# Patient Record
Sex: FEMALE | Race: AMERICAN INDIAN OR ALASKA NATIVE
[De-identification: names, ages, dates, MRNs, and addresses within clinical notes are randomized per-mention and may not be internally consistent; named-entity substitution may affect disease eponyms.]

---

## 2020-03-23 ENCOUNTER — HOSPITAL ENCOUNTER (EMERGENCY)
Dept: HOSPITAL 5 - ED | Age: 55
Discharge: HOME | End: 2020-03-23
Payer: COMMERCIAL

## 2020-03-23 VITALS — DIASTOLIC BLOOD PRESSURE: 109 MMHG | SYSTOLIC BLOOD PRESSURE: 158 MMHG

## 2020-03-23 DIAGNOSIS — G51.0: Primary | ICD-10-CM

## 2020-03-23 DIAGNOSIS — I10: ICD-10-CM

## 2020-03-23 DIAGNOSIS — F17.200: ICD-10-CM

## 2020-03-23 LAB
APTT BLD: 27.4 SEC. (ref 24.2–36.6)
BASOPHILS # (AUTO): 0 K/MM3 (ref 0–0.1)
BASOPHILS NFR BLD AUTO: 0.7 % (ref 0–1.8)
BUN SERPL-MCNC: 19 MG/DL (ref 7–17)
BUN/CREAT SERPL: 21 %
CALCIUM SERPL-MCNC: 9.5 MG/DL (ref 8.4–10.2)
EOSINOPHIL # BLD AUTO: 0.1 K/MM3 (ref 0–0.4)
EOSINOPHIL NFR BLD AUTO: 1.9 % (ref 0–4.3)
HCT VFR BLD CALC: 39.2 % (ref 30.3–42.9)
HEMOLYSIS INDEX: 7
HGB BLD-MCNC: 12.7 GM/DL (ref 10.1–14.3)
INR PPP: 0.95 (ref 0.87–1.13)
LYMPHOCYTES # BLD AUTO: 2.5 K/MM3 (ref 1.2–5.4)
LYMPHOCYTES NFR BLD AUTO: 46.7 % (ref 13.4–35)
MCHC RBC AUTO-ENTMCNC: 32 % (ref 30–34)
MCV RBC AUTO: 89 FL (ref 79–97)
MONOCYTES # (AUTO): 0.5 K/MM3 (ref 0–0.8)
MONOCYTES % (AUTO): 9.3 % (ref 0–7.3)
PLATELET # BLD: 282 K/MM3 (ref 140–440)
RBC # BLD AUTO: 4.42 M/MM3 (ref 3.65–5.03)

## 2020-03-23 PROCEDURE — 82962 GLUCOSE BLOOD TEST: CPT

## 2020-03-23 PROCEDURE — 36415 COLL VENOUS BLD VENIPUNCTURE: CPT

## 2020-03-23 PROCEDURE — 93010 ELECTROCARDIOGRAM REPORT: CPT

## 2020-03-23 PROCEDURE — 80048 BASIC METABOLIC PNL TOTAL CA: CPT

## 2020-03-23 PROCEDURE — 85610 PROTHROMBIN TIME: CPT

## 2020-03-23 PROCEDURE — 85025 COMPLETE CBC W/AUTO DIFF WBC: CPT

## 2020-03-23 PROCEDURE — 70450 CT HEAD/BRAIN W/O DYE: CPT

## 2020-03-23 PROCEDURE — 84484 ASSAY OF TROPONIN QUANT: CPT

## 2020-03-23 PROCEDURE — 85730 THROMBOPLASTIN TIME PARTIAL: CPT

## 2020-03-23 PROCEDURE — 85670 THROMBIN TIME PLASMA: CPT

## 2020-03-23 PROCEDURE — 93005 ELECTROCARDIOGRAM TRACING: CPT

## 2020-03-23 NOTE — EMERGENCY DEPARTMENT REPORT
HPI





- General


Chief Complaint: Neuro Symptoms/Deficit


Time Seen by Provider: 03/23/20 07:13





- HPI


HPI: 





Room 26








The patient is a 55-year-old female present with a chief complaint of right 

facial numbness and weakness.  Patient states her symptoms began 2 nights ago 

with numbness of the right side of her tongue.  Patient states yesterday 

afternoon she noticed numbness to the right side of her face and the inability 

to close the right eye.  Patient denies URI symptoms.  Patient denies extremity 

weakness or numbness.














ED Past Medical Hx





- Past Medical History


Previous Medical History?: No





- Surgical History


Past Surgical History?: Yes


Additional Surgical History: Hysterectomy





- Family History


Family history: no significant





- Social History


Smoking Status: Current Some Day Smoker


Substance Use Type: Alcohol (Occasional), Marijuana





- Medications


Home Medications: 


                                Home Medications











 Medication  Instructions  Recorded  Confirmed  Last Taken  Type


 


Carboxymethylcellulose Sodium 2 drops OP PRN PRN #15 ml 03/23/20  Unknown Rx





[Artificial Tears]     


 


Valacyclovir HCl [Valtrex] 1,000 mg PO TID #21 tablet 03/23/20  Unknown Rx


 


amLODIPine 5 mg PO DAILY #90 tab 03/23/20  Unknown Rx


 


predniSONE [Deltasone] 80 mg PO QDAY #28 tab 03/23/20  Unknown Rx














ED Review of Systems


ROS: 


Stated complaint: POSS STROKE/R SIDE FACIAL NUMBNESS


Other details as noted in HPI





Constitutional: no symptoms reported


Eyes: other (Unable to close right eye)


ENT: denies: throat pain


Respiratory: denies: cough


Cardiovascular: denies: chest pain


Endocrine: no symptoms reported


Gastrointestinal: denies: abdominal pain


Genitourinary: denies: dysuria


Neurological: headache, weakness, paresthesias





Physical Exam





- Physical Exam


Vital Signs: 


                                   Vital Signs











  03/23/20 03/23/20





  01:02 06:39


 


Temperature 97.9 F 


 


Pulse Rate 64 64


 


Respiratory 20 20





Rate  


 


Blood Pressure 167/105 


 


Blood Pressure  158/109





[Left]  


 


O2 Sat by Pulse 99 100





Oximetry  











Physical Exam: 





GENERAL: The patient is well-developed well-nourished female sitting on 

stretcher not appearing to be in acute distress


HEENT: Normocephalic.  Atraumatic.  Extraocular motions are intact.  Patient 

unable to close the right eye


NECK: Supple.  Trachea midline


CHEST/LUNGS: Clear to auscultation.  There is no respiratory distress noted.


HEART/CARDIOVASCULAR: Regular.  There is no tachycardia.  There is no gallop rub

 or murmur.


ABDOMEN: Abdomen is soft, nontender.  Patient has normal bowel sounds.  There is

 no abdominal distention.


SKIN: There is no rash.  There is no edema.  There is no diaphoresis.


NEURO: The patient is awake, alert, and oriented.  The patient is cooperative.  

Patient has a 7th nerve palsy on the right otherwise cranial nerves II through 

XII grossly intact.  The patient has normal speech


MUSCULOSKELETAL:  There is no evidence of acute injury.





ED Course


                                   Vital Signs











  03/23/20 03/23/20





  01:02 06:39


 


Temperature 97.9 F 


 


Pulse Rate 64 64


 


Respiratory 20 20





Rate  


 


Blood Pressure 167/105 


 


Blood Pressure  158/109





[Left]  


 


O2 Sat by Pulse 99 100





Oximetry  














ED Medical Decision Making





- Lab Data


Result diagrams: 


                                 03/23/20 01:33





                                 03/23/20 01:33





                                Laboratory Tests











  03/23/20 03/23/20 03/23/20





  01:23 01:33 01:33


 


WBC   5.4 


 


RBC   4.42 


 


Hgb   12.7 


 


Hct   39.2 


 


MCV   89 


 


MCH   29 


 


MCHC   32 


 


RDW   14.9 


 


Plt Count   282 


 


Lymph % (Auto)   46.7 H 


 


Mono % (Auto)   9.3 H 


 


Eos % (Auto)   1.9 


 


Baso % (Auto)   0.7 


 


Lymph #   2.5 


 


Mono #   0.5 


 


Eos #   0.1 


 


Baso #   0.0 


 


Seg Neutrophils %   41.4 


 


Seg Neutrophils #   2.3 


 


PT    12.8


 


INR    0.95


 


APTT    27.4


 


Thrombin Time    16.3


 


Sodium   


 


Potassium   


 


Chloride   


 


Carbon Dioxide   


 


Anion Gap   


 


BUN   


 


Creatinine   


 


Estimated GFR   


 


BUN/Creatinine Ratio   


 


Glucose   


 


POC Glucose  86  


 


Calcium   


 


Troponin T   














  03/23/20





  01:33


 


WBC 


 


RBC 


 


Hgb 


 


Hct 


 


MCV 


 


MCH 


 


MCHC 


 


RDW 


 


Plt Count 


 


Lymph % (Auto) 


 


Mono % (Auto) 


 


Eos % (Auto) 


 


Baso % (Auto) 


 


Lymph # 


 


Mono # 


 


Eos # 


 


Baso # 


 


Seg Neutrophils % 


 


Seg Neutrophils # 


 


PT 


 


INR 


 


APTT 


 


Thrombin Time 


 


Sodium  138


 


Potassium  4.6


 


Chloride  102.8


 


Carbon Dioxide  25


 


Anion Gap  15


 


BUN  19 H


 


Creatinine  0.9


 


Estimated GFR  > 60


 


BUN/Creatinine Ratio  21


 


Glucose  96


 


POC Glucose 


 


Calcium  9.5


 


Troponin T  < 0.010














- EKG Data


-: EKG Interpreted by Me


EKG shows normal: sinus rhythm


Rate: normal





- EKG Data


When compared to previous EKG there are: previous EKG unavailable


Interpretation: other (No ischemic changes seen)





- Radiology Data


Radiology results: report reviewed (CT head), image reviewed (CT head)





CT head (read by radiologist)-no acute findings





- Differential Diagnosis


Bell's palsy, CVA


Critical care attestation.: 


If time is entered above; I have spent that time in minutes in the direct care 

of this critically ill patient, excluding procedure time.








ED Disposition


Clinical Impression: 


 Bell's palsy, Hypertension





Disposition: DC-01 TO HOME OR SELFCARE


Is pt being admited?: No


Does the pt Need Aspirin: No


Condition: Stable


Instructions:  Alvarez Palsy (ED), Hypertension (ED)


Additional Instructions: 


Return to the emergency department should you develop worsening symptoms, 

inability to tolerate food or liquids, high fever or any other concerns


Prescriptions: 


amLODIPine 5 mg PO DAILY #90 tab


Carboxymethylcellulose Sodium [Artificial Tears] 2 drops OP PRN PRN #15 ml


 PRN Reason: Dry Eye(S)


predniSONE [Deltasone] 80 mg PO QDAY #28 tab


Valacyclovir HCl [Valtrex] 1,000 mg PO TID #21 tablet


Referrals: 


LILLY LAMB [Other] - 3-5 Days


NICOLETTE LOW MD [Staff Physician] - 3-5 Days (Dr Leon is a neurologist.  

Please follow-up with him for further evaluate)


Time of Disposition: 07:34

## 2020-03-23 NOTE — CAT SCAN REPORT
CT head/brain wo con 



INDICATION / CLINICAL INFORMATION:

Pt complains of RIGHT sided facial numbness and loss of RIGHT sided facial motor control, otherwise a
symptomatic.  [place] Palsy vs. possible stroke..



TECHNIQUE:

All CT scans at this location are performed using CT dose reduction for ALARA by means of automated e
xposure control. 



COMPARISON:

None available.



FINDINGS:

Ventricle size is normal. No mass or mass effect is seen. There is no evidence of intracranial hemorr
itz. No obvious area of infarction is identified. Visualized paranasal sinuses are clear.



IMPRESSION:

No acute findings



Signer Name: Bakari Lilly MD FACR 

Signed: 3/23/2020 2:01 AM

Workstation Name: [id number]